# Patient Record
Sex: FEMALE
[De-identification: names, ages, dates, MRNs, and addresses within clinical notes are randomized per-mention and may not be internally consistent; named-entity substitution may affect disease eponyms.]

---

## 2023-05-31 PROBLEM — Z00.00 ENCOUNTER FOR PREVENTIVE HEALTH EXAMINATION: Status: ACTIVE | Noted: 2023-05-31

## 2023-06-13 ENCOUNTER — APPOINTMENT (OUTPATIENT)
Dept: OTOLARYNGOLOGY | Facility: CLINIC | Age: 43
End: 2023-06-13
Payer: COMMERCIAL

## 2023-06-13 VITALS
HEART RATE: 58 BPM | TEMPERATURE: 98 F | BODY MASS INDEX: 31.49 KG/M2 | HEIGHT: 58 IN | SYSTOLIC BLOOD PRESSURE: 111 MMHG | OXYGEN SATURATION: 98 % | DIASTOLIC BLOOD PRESSURE: 73 MMHG | WEIGHT: 150 LBS

## 2023-06-13 DIAGNOSIS — Z83.3 FAMILY HISTORY OF DIABETES MELLITUS: ICD-10-CM

## 2023-06-13 DIAGNOSIS — Z78.9 OTHER SPECIFIED HEALTH STATUS: ICD-10-CM

## 2023-06-13 DIAGNOSIS — H61.21 IMPACTED CERUMEN, RIGHT EAR: ICD-10-CM

## 2023-06-13 DIAGNOSIS — Z86.39 PERSONAL HISTORY OF OTHER ENDOCRINE, NUTRITIONAL AND METABOLIC DISEASE: ICD-10-CM

## 2023-06-13 DIAGNOSIS — M26.629 ARTHRALGIA OF TEMPOROMANDIBULAR JOINT,: ICD-10-CM

## 2023-06-13 DIAGNOSIS — D61.82 MYELOPHTHISIS: ICD-10-CM

## 2023-06-13 DIAGNOSIS — F45.8 OTHER SOMATOFORM DISORDERS: ICD-10-CM

## 2023-06-13 PROCEDURE — 92550 TYMPANOMETRY & REFLEX THRESH: CPT | Mod: 52

## 2023-06-13 PROCEDURE — 92557 COMPREHENSIVE HEARING TEST: CPT

## 2023-06-13 PROCEDURE — 99202 OFFICE O/P NEW SF 15 MIN: CPT | Mod: 25

## 2023-06-13 PROCEDURE — G0268 REMOVAL OF IMPACTED WAX MD: CPT | Mod: RT

## 2023-06-13 RX ORDER — CHROMIUM 200 MCG
TABLET ORAL
Refills: 0 | Status: ACTIVE | COMMUNITY

## 2023-06-13 NOTE — CONSULT LETTER
[Dear  ___] : Dear  [unfilled], [Consult Letter:] : I had the pleasure of evaluating your patient, [unfilled]. [Please see my note below.] : Please see my note below. [Consult Closing:] : Thank you very much for allowing me to participate in the care of this patient.  If you have any questions, please do not hesitate to contact me. [Sincerely,] : Sincerely, [FreeTextEntry3] : RUTHIE Nguyen Jr, MD, FAAOHNS\par Otolaryngologist\par [facility]

## 2023-06-13 NOTE — ASSESSMENT
[FreeTextEntry1] : I explained that her ears are ok & she should stop using the drops. \par \par Discussed conservative TMJ treatment including hot soaks, NSAIDs, and chewing avoidance. Asked the patient to confer with their dentist regarding a possible night splint. Discussed bruxism & its possible contribution to TMJD. Reviewed relaxation exercises & a possible trial of muscle relaxants as well as possible eventual botox.\par

## 2023-06-13 NOTE — HISTORY OF PRESENT ILLNESS
[de-identified] : Iranian speaker, BONILLA used- ID # [id number]\par 4 months ago she had a week of bleeding from the ear canal after a "ball exploded" in her ear canal. Since then she's had L ear pain and fluctuating trouble hearing. She was told that she has a fungal infection. She has been using some kind of ear drops (3 bottles over 3 months) but doesn't know the name. Pain now 1/10\par She doesn't know if she grinds her teeth; (+) clicking with chewing and her ear pain worsens w/ chewing

## 2023-06-13 NOTE — PHYSICAL EXAM
[Binocular Microscopic Exam] : Binocular microscopic exam was performed [Normal] : no masses and lesions seen, face is symmetric [de-identified] : mild ttp bilat [FreeTextEntry8] : obstructing cerumen removed with suction [de-identified] : very ground-down molars